# Patient Record
Sex: MALE | Race: WHITE | NOT HISPANIC OR LATINO | URBAN - METROPOLITAN AREA
[De-identification: names, ages, dates, MRNs, and addresses within clinical notes are randomized per-mention and may not be internally consistent; named-entity substitution may affect disease eponyms.]

---

## 2021-08-20 ENCOUNTER — NEW PATIENT COMPREHENSIVE (OUTPATIENT)
Dept: URBAN - METROPOLITAN AREA CLINIC 49 | Facility: CLINIC | Age: 81
End: 2021-08-20

## 2021-08-20 DIAGNOSIS — H16.223: ICD-10-CM

## 2021-08-20 PROCEDURE — 92134 CPTRZ OPH DX IMG PST SGM RTA: CPT

## 2021-08-20 PROCEDURE — 92004 COMPRE OPH EXAM NEW PT 1/>: CPT

## 2021-08-20 RX ORDER — LIFITEGRAST 50 MG/ML: 1 SOLUTION/ DROPS OPHTHALMIC TWICE A DAY

## 2021-08-20 ASSESSMENT — VISUAL ACUITY
OD_CC: 20/20-2
OD_SC: 20/30+2
OU_CC: 20/20
OS_CC: 20/20
OU_SC: 20/20
OS_SC: 20/20
OD_PH: 20/20-2

## 2021-08-20 ASSESSMENT — TONOMETRY
OD_IOP_MMHG: 14
OS_IOP_MMHG: 14

## 2021-08-20 NOTE — PATIENT DISCUSSION
Dry eye regiment discussed with patient. Use PF Artificial Tears OU 3-4x/days. Use Warm Compresses OU BID. Use Lid Scrubs OU BID. Use Gel Ointment OU QHS. RTC 6 week follow up.

## 2021-08-20 NOTE — PATIENT DISCUSSION
History of diplopia (longstanding). Patient has prism in current glasses. Patient has previously seen two neuro-ophthalmologists in the past. Patient is experiencing diagnol diplopia at near only with/without current NVOs.

## 2021-08-20 NOTE — PATIENT DISCUSSION
Patient is interested in starting [place].  If too expensive with insurance will switch to [place].

## 2021-09-21 ENCOUNTER — MOTILITY CHECK (OUTPATIENT)
Dept: URBAN - METROPOLITAN AREA CLINIC 49 | Facility: CLINIC | Age: 81
End: 2021-09-21

## 2021-09-21 DIAGNOSIS — H53.2: ICD-10-CM

## 2021-09-21 PROCEDURE — 92060 SENSORIMOTOR EXAMINATION: CPT

## 2021-09-21 PROCEDURE — 92015 DETERMINE REFRACTIVE STATE: CPT

## 2021-09-21 RX ORDER — CYCLOSPORINE 0.5 MG/ML: 1 EMULSION OPHTHALMIC TWICE A DAY

## 2021-09-21 ASSESSMENT — VISUAL ACUITY
OU_SC: J2
OU_SC: 20/20
OS_SC: 20/20
OD_SC: 20/25

## 2021-09-21 NOTE — PATIENT DISCUSSION
Educated patient on prism and adaptation period. Educated prism may take multiple adjustments and appointment.

## 2021-09-21 NOTE — PATIENT DISCUSSION
History of diplopia (longstanding). Patient has prism in current glasses. Patient has previously seen two neuro-ophthalmologists in the past. Left exotropia at near. Ortho at distance. EOMs full, no APD. Patient is experiencing diplopia at near only with/without current NVOs (improved with NVOs). Increased prism at near today. Glasses Rx given with incorporated prism at near and without prism at distance. Recommend NVOs/DVOs.

## 2021-10-29 ENCOUNTER — FOLLOW UP (OUTPATIENT)
Dept: URBAN - METROPOLITAN AREA CLINIC 49 | Facility: CLINIC | Age: 81
End: 2021-10-29

## 2021-10-29 DIAGNOSIS — H16.223: ICD-10-CM

## 2021-10-29 DIAGNOSIS — H53.2: ICD-10-CM

## 2021-10-29 PROCEDURE — 92014 COMPRE OPH EXAM EST PT 1/>: CPT

## 2021-10-29 ASSESSMENT — VISUAL ACUITY
OD_CC: 20/25-2
OS_CC: 20/20-2

## 2021-10-29 ASSESSMENT — TONOMETRY
OS_IOP_MMHG: 15
OD_IOP_MMHG: 15

## 2021-10-29 NOTE — PATIENT DISCUSSION
Patient given a prescription for glasses with incorporated prism to control diplopia at last visit. Patient states he is still experiencing double vision with new glasses. Recommend patient to have glasses checked in optical. Recommend patient to see ophthalmologist (Dr. Peggy Schmid), gave patient information on Dr. Peggy Schmid. Patient has Parkinson's disease and informed patient that the disease could likely be causing the vision fluctuation and diplopia.

## 2021-10-29 NOTE — PATIENT DISCUSSION
Dry eye regiment discussed with patient. Use PF Artificial Tears OU 3-4x/days. Use Warm Compresses OU BID. Use Lid Scrubs OU BID. Use Gel Ointment OU QHS.

## 2023-01-19 ENCOUNTER — COMPREHENSIVE EXAM (OUTPATIENT)
Dept: URBAN - METROPOLITAN AREA CLINIC 53 | Facility: CLINIC | Age: 83
End: 2023-01-19

## 2023-01-19 DIAGNOSIS — H16.223: ICD-10-CM

## 2023-01-19 DIAGNOSIS — H35.3111: ICD-10-CM

## 2023-01-19 DIAGNOSIS — H43.813: ICD-10-CM

## 2023-01-19 PROCEDURE — 92134 CPTRZ OPH DX IMG PST SGM RTA: CPT

## 2023-01-19 PROCEDURE — 92014 COMPRE OPH EXAM EST PT 1/>: CPT

## 2023-01-19 ASSESSMENT — VISUAL ACUITY
OD_SC: 20/40
OS_SC: 20/60+2
OS_PH: 20/40+2
OU_SC: 20/40+2

## 2023-01-19 ASSESSMENT — TONOMETRY
OS_IOP_MMHG: 13
OD_IOP_MMHG: 12

## 2023-01-19 NOTE — PATIENT DISCUSSION
Patient given a prescription for glasses with incorporated prism to control diplopia at last visit. Patient states he is still experiencing double vision with new glasses. Recommend patient to have glasses checked in optical. Recommend patient to see ophthalmologist (Dr. Sherry Phelan), patient does not want to travel. Explained that we can try prism one more time. RTC for motility at patients convenience.  Patient has Parkinson's disease and informed patient that the disease could likely be causing the vision fluctuation and diplopia.

## 2023-04-04 ENCOUNTER — CONTACT LENSES/GLASSES VISIT (OUTPATIENT)
Dept: URBAN - METROPOLITAN AREA CLINIC 53 | Facility: CLINIC | Age: 83
End: 2023-04-04

## 2023-04-04 DIAGNOSIS — H53.2: ICD-10-CM

## 2023-04-04 PROCEDURE — 92015 DETERMINE REFRACTIVE STATE: CPT

## 2023-04-04 PROCEDURE — 92060 SENSORIMOTOR EXAMINATION: CPT

## 2023-04-04 ASSESSMENT — VISUAL ACUITY
OS_SC: 20/40-1
OD_SC: 20/40-2
OD_PH: 20/25

## 2024-01-02 ENCOUNTER — COMPREHENSIVE EXAM (OUTPATIENT)
Dept: URBAN - METROPOLITAN AREA CLINIC 53 | Facility: CLINIC | Age: 84
End: 2024-01-02

## 2024-01-02 DIAGNOSIS — H35.3111: ICD-10-CM

## 2024-01-02 DIAGNOSIS — H16.223: ICD-10-CM

## 2024-01-02 DIAGNOSIS — H43.813: ICD-10-CM

## 2024-01-02 PROCEDURE — 92014 COMPRE OPH EXAM EST PT 1/>: CPT

## 2024-01-02 PROCEDURE — 92134 CPTRZ OPH DX IMG PST SGM RTA: CPT

## 2024-01-02 ASSESSMENT — VISUAL ACUITY
OS_PH: 20/30-2
OS_SC: 20/40-2
OD_SC: 20/40+2
OU_CC: J5 @ 18"

## 2024-01-02 ASSESSMENT — TONOMETRY
OS_IOP_MMHG: 11
OD_IOP_MMHG: 12

## 2024-02-26 ENCOUNTER — CONTACT LENSES/GLASSES VISIT (OUTPATIENT)
Dept: URBAN - METROPOLITAN AREA CLINIC 53 | Facility: CLINIC | Age: 84
End: 2024-02-26

## 2024-02-26 DIAGNOSIS — H16.223: ICD-10-CM

## 2024-02-26 DIAGNOSIS — H53.2: ICD-10-CM

## 2024-02-26 PROCEDURE — 99213 OFFICE O/P EST LOW 20 MIN: CPT

## 2024-02-26 ASSESSMENT — VISUAL ACUITY
OU_SC: J2
OD_SC: 20/25-2
OS_SC: 20/40-1
OS_PH: 20/30-2